# Patient Record
Sex: FEMALE | Race: OTHER | NOT HISPANIC OR LATINO | URBAN - METROPOLITAN AREA
[De-identification: names, ages, dates, MRNs, and addresses within clinical notes are randomized per-mention and may not be internally consistent; named-entity substitution may affect disease eponyms.]

---

## 2018-12-18 PROBLEM — Z00.00 ENCOUNTER FOR PREVENTIVE HEALTH EXAMINATION: Status: ACTIVE | Noted: 2018-12-18

## 2019-07-10 ENCOUNTER — EMERGENCY (EMERGENCY)
Facility: HOSPITAL | Age: 44
LOS: 1 days | Discharge: HOME | End: 2019-07-10
Attending: EMERGENCY MEDICINE | Admitting: EMERGENCY MEDICINE
Payer: COMMERCIAL

## 2019-07-10 VITALS
HEART RATE: 91 BPM | RESPIRATION RATE: 16 BRPM | OXYGEN SATURATION: 100 % | SYSTOLIC BLOOD PRESSURE: 152 MMHG | DIASTOLIC BLOOD PRESSURE: 98 MMHG | TEMPERATURE: 98 F

## 2019-07-10 PROCEDURE — 73502 X-RAY EXAM HIP UNI 2-3 VIEWS: CPT | Mod: 26,LT

## 2019-07-10 PROCEDURE — 93010 ELECTROCARDIOGRAM REPORT: CPT

## 2019-07-10 PROCEDURE — 71046 X-RAY EXAM CHEST 2 VIEWS: CPT | Mod: 26

## 2019-07-10 PROCEDURE — 99283 EMERGENCY DEPT VISIT LOW MDM: CPT | Mod: 25

## 2019-07-10 PROCEDURE — 73552 X-RAY EXAM OF FEMUR 2/>: CPT | Mod: 26,LT

## 2019-07-10 RX ORDER — ACETAMINOPHEN 500 MG
650 TABLET ORAL ONCE
Refills: 0 | Status: COMPLETED | OUTPATIENT
Start: 2019-07-10 | End: 2019-07-10

## 2019-07-10 RX ADMIN — Medication 650 MILLIGRAM(S): at 21:07
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
## 2019-07-10 NOTE — ED PROVIDER NOTE - NSFOLLOWUPINSTRUCTIONS_ED_ALL_ED_FT
FOLLOW UP WITH YOUR  DOCTOR WITHIN 1 WEEK  BRING THE COPIES OF YOUR RESULTS WITH YOU (PROVIDED)  CAN TAKE TYLENOL 650MG ORALLY EVERY 6 HOURS FOR PAIN OR FEVER.  IBUPROFEN 400MG ORALLY EVERY 6 HOURS FOR PAIN OR FEVER.    CAN TAKE TYLENOL AND IBUPROFEN AT THE SAME TIME  RETURN TO ED FOR NEW OR WORSENING SYMPTOMS.
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
## 2023-03-14 ENCOUNTER — EMERGENCY (EMERGENCY)
Facility: HOSPITAL | Age: 48
LOS: 1 days | Discharge: HOME | End: 2023-03-14
Attending: STUDENT IN AN ORGANIZED HEALTH CARE EDUCATION/TRAINING PROGRAM | Admitting: STUDENT IN AN ORGANIZED HEALTH CARE EDUCATION/TRAINING PROGRAM
Payer: MEDICAID

## 2023-03-14 VITALS
SYSTOLIC BLOOD PRESSURE: 158 MMHG | RESPIRATION RATE: 16 BRPM | OXYGEN SATURATION: 100 % | HEART RATE: 75 BPM | DIASTOLIC BLOOD PRESSURE: 80 MMHG

## 2023-03-14 VITALS
TEMPERATURE: 99 F | OXYGEN SATURATION: 100 % | DIASTOLIC BLOOD PRESSURE: 90 MMHG | HEART RATE: 74 BPM | SYSTOLIC BLOOD PRESSURE: 150 MMHG | RESPIRATION RATE: 15 BRPM

## 2023-03-14 LAB
ALBUMIN SERPL ELPH-MCNC: 4.4 G/DL — SIGNIFICANT CHANGE UP (ref 3.3–5)
ALP SERPL-CCNC: 47 U/L — SIGNIFICANT CHANGE UP (ref 40–120)
ALT FLD-CCNC: 11 U/L — SIGNIFICANT CHANGE UP (ref 4–33)
ANION GAP SERPL CALC-SCNC: 14 MMOL/L — SIGNIFICANT CHANGE UP (ref 7–14)
AST SERPL-CCNC: 21 U/L — SIGNIFICANT CHANGE UP (ref 4–32)
BASE EXCESS BLDV CALC-SCNC: 2.1 MMOL/L — SIGNIFICANT CHANGE UP (ref -2–3)
BASOPHILS # BLD AUTO: 0.04 K/UL — SIGNIFICANT CHANGE UP (ref 0–0.2)
BASOPHILS NFR BLD AUTO: 0.6 % — SIGNIFICANT CHANGE UP (ref 0–2)
BILIRUB SERPL-MCNC: 0.3 MG/DL — SIGNIFICANT CHANGE UP (ref 0.2–1.2)
BLOOD GAS VENOUS COMPREHENSIVE RESULT: SIGNIFICANT CHANGE UP
BUN SERPL-MCNC: 10 MG/DL — SIGNIFICANT CHANGE UP (ref 7–23)
CALCIUM SERPL-MCNC: 9.2 MG/DL — SIGNIFICANT CHANGE UP (ref 8.4–10.5)
CHLORIDE BLDV-SCNC: 105 MMOL/L — SIGNIFICANT CHANGE UP (ref 96–108)
CHLORIDE SERPL-SCNC: 107 MMOL/L — SIGNIFICANT CHANGE UP (ref 98–107)
CO2 BLDV-SCNC: 28.7 MMOL/L — HIGH (ref 22–26)
CO2 SERPL-SCNC: 22 MMOL/L — SIGNIFICANT CHANGE UP (ref 22–31)
CREAT SERPL-MCNC: 0.52 MG/DL — SIGNIFICANT CHANGE UP (ref 0.5–1.3)
EGFR: 115 ML/MIN/1.73M2 — SIGNIFICANT CHANGE UP
EOSINOPHIL # BLD AUTO: 0.05 K/UL — SIGNIFICANT CHANGE UP (ref 0–0.5)
EOSINOPHIL NFR BLD AUTO: 0.7 % — SIGNIFICANT CHANGE UP (ref 0–6)
GAS PNL BLDV: 139 MMOL/L — SIGNIFICANT CHANGE UP (ref 136–145)
GLUCOSE BLDV-MCNC: 96 MG/DL — SIGNIFICANT CHANGE UP (ref 70–99)
GLUCOSE SERPL-MCNC: 95 MG/DL — SIGNIFICANT CHANGE UP (ref 70–99)
HCO3 BLDV-SCNC: 27 MMOL/L — SIGNIFICANT CHANGE UP (ref 22–29)
HCT VFR BLD CALC: 35.9 % — SIGNIFICANT CHANGE UP (ref 34.5–45)
HCT VFR BLDA CALC: 38 % — SIGNIFICANT CHANGE UP (ref 34.5–46.5)
HGB BLD CALC-MCNC: 12.5 G/DL — SIGNIFICANT CHANGE UP (ref 11.7–16.1)
HGB BLD-MCNC: 11.9 G/DL — SIGNIFICANT CHANGE UP (ref 11.5–15.5)
IANC: 4.69 K/UL — SIGNIFICANT CHANGE UP (ref 1.8–7.4)
IMM GRANULOCYTES NFR BLD AUTO: 0.3 % — SIGNIFICANT CHANGE UP (ref 0–0.9)
LACTATE BLDV-MCNC: 2.2 MMOL/L — HIGH (ref 0.5–2)
LYMPHOCYTES # BLD AUTO: 1.65 K/UL — SIGNIFICANT CHANGE UP (ref 1–3.3)
LYMPHOCYTES # BLD AUTO: 24.3 % — SIGNIFICANT CHANGE UP (ref 13–44)
MCHC RBC-ENTMCNC: 30.3 PG — SIGNIFICANT CHANGE UP (ref 27–34)
MCHC RBC-ENTMCNC: 33.1 GM/DL — SIGNIFICANT CHANGE UP (ref 32–36)
MCV RBC AUTO: 91.3 FL — SIGNIFICANT CHANGE UP (ref 80–100)
MONOCYTES # BLD AUTO: 0.34 K/UL — SIGNIFICANT CHANGE UP (ref 0–0.9)
MONOCYTES NFR BLD AUTO: 5 % — SIGNIFICANT CHANGE UP (ref 2–14)
NEUTROPHILS # BLD AUTO: 4.69 K/UL — SIGNIFICANT CHANGE UP (ref 1.8–7.4)
NEUTROPHILS NFR BLD AUTO: 69.1 % — SIGNIFICANT CHANGE UP (ref 43–77)
NRBC # BLD: 0 /100 WBCS — SIGNIFICANT CHANGE UP (ref 0–0)
NRBC # FLD: 0 K/UL — SIGNIFICANT CHANGE UP (ref 0–0)
PCO2 BLDV: 44 MMHG — SIGNIFICANT CHANGE UP (ref 39–52)
PH BLDV: 7.4 — SIGNIFICANT CHANGE UP (ref 7.32–7.43)
PLATELET # BLD AUTO: 204 K/UL — SIGNIFICANT CHANGE UP (ref 150–400)
PO2 BLDV: 51 MMHG — HIGH (ref 25–45)
POTASSIUM BLDV-SCNC: 3.7 MMOL/L — SIGNIFICANT CHANGE UP (ref 3.5–5.1)
POTASSIUM SERPL-MCNC: 3.7 MMOL/L — SIGNIFICANT CHANGE UP (ref 3.5–5.3)
POTASSIUM SERPL-SCNC: 3.7 MMOL/L — SIGNIFICANT CHANGE UP (ref 3.5–5.3)
PROT SERPL-MCNC: 7.3 G/DL — SIGNIFICANT CHANGE UP (ref 6–8.3)
RBC # BLD: 3.93 M/UL — SIGNIFICANT CHANGE UP (ref 3.8–5.2)
RBC # FLD: 11.9 % — SIGNIFICANT CHANGE UP (ref 10.3–14.5)
SAO2 % BLDV: 78.7 % — SIGNIFICANT CHANGE UP (ref 67–88)
SODIUM SERPL-SCNC: 143 MMOL/L — SIGNIFICANT CHANGE UP (ref 135–145)
TROPONIN T, HIGH SENSITIVITY RESULT: <6 NG/L — SIGNIFICANT CHANGE UP
WBC # BLD: 6.79 K/UL — SIGNIFICANT CHANGE UP (ref 3.8–10.5)
WBC # FLD AUTO: 6.79 K/UL — SIGNIFICANT CHANGE UP (ref 3.8–10.5)

## 2023-03-14 PROCEDURE — 99285 EMERGENCY DEPT VISIT HI MDM: CPT

## 2023-03-14 PROCEDURE — 71045 X-RAY EXAM CHEST 1 VIEW: CPT | Mod: 26

## 2023-03-14 NOTE — ED PROVIDER NOTE - CARE PLAN
Principal Discharge DX:	Elevated blood pressure reading  Assessment and plan of treatment:	During your ER visit your evaluated for elevated blood pressure. He had an EKG done and blood work done you're provided with the results. Follow-up with your primary care doctor consider changing antihypertensive medications to first or second line antihypertensives. Return to the ED if you exhibit any new, continued or worsening symptoms.  Secondary Diagnosis:	Dizziness   1

## 2023-03-14 NOTE — ED PROVIDER NOTE - OBJECTIVE STATEMENT
48 y/o F with PMH HTN on metoprolol p/w dizziness and elevated blood pressure. Patient states she had some dizziness in the setting of elevated blood pressure in the 200s. Went to urgent care and was sent here blood pressure is improved. Patient denies any dizziness currently denies any chest pain. Patient states she was started on metoprolol about 4 months ago after being on amlodipine. States her blood pressures have been intermittently elevated. She denies nausea, vomiting, abdominal pain. She states she felt somewhat lightheaded earlier today while her blood pressure was increased. Patient was brought by EMS states that her blood pressure is improved and she is feeling better. Patient denies taking any medications to raise her blood pressure. Denies any alcohol or drug use. Patient follows by PCP was previously on amlodipine which she had changed

## 2023-03-14 NOTE — ED PROVIDER NOTE - PLAN OF CARE
During your ER visit your evaluated for elevated blood pressure. He had an EKG done and blood work done you're provided with the results. Follow-up with your primary care doctor consider changing antihypertensive medications to first or second line antihypertensives. Return to the ED if you exhibit any new, continued or worsening symptoms.

## 2023-03-14 NOTE — ED ADULT NURSE NOTE - OBJECTIVE STATEMENT
46 y/o female, a&ox4, received to ED with c/o HTN. Pt reports elevated BP at home, 190s systolic, denies HA, dizziness, CP, SOB, or other s/s of HTN. PM of HTN, claims she is complaint with her daily meds. Pt arrives with 20GIV to [facility] from EMS. Labs collected and sent off. Respirations are even and unlabored, no signs of respiratory distress.

## 2023-03-14 NOTE — ED PROVIDER NOTE - PATIENT PORTAL LINK FT
You can access the FollowMyHealth Patient Portal offered by [facility] by registering at the following website: [email or web address]. By joining [facility]’s FollowMyHealth portal, you will also be able to view your health information using other applications (apps) compatible with our system.

## 2023-03-14 NOTE — ED PROVIDER NOTE - CLINICAL SUMMARY MEDICAL DECISION MAKING FREE TEXT BOX
48 y/o F with PMH HTN on metoprolol p/w dizziness and elevated blood pressure. Patient states she had some dizziness in the setting of elevated blood pressure in the 200s. Went to urgent care and was sent here blood pressure is improved. Patient denies any dizziness currently denies any chest pain. Patient with neuro exam normal. EKG within normal limits. Nonspecific T wave inversions in V3 we'll obtain CBC, CMP, troponin, chest x-ray. Patient will follow-up with PCP recommended potentially changing blood pressure medication as patient is not on first-line antihypertensive

## 2023-03-14 NOTE — ED PROVIDER NOTE - NS ED ROS FT
denies fever, chills, chest pain, SOB, abdominal pain, diarrhea, dysuria, syncope, bleeding, new rash,weakness, numbness, blurred vision + dizziness   ROS  otherwise negative as per HPI

## 2023-03-14 NOTE — ED ADULT TRIAGE NOTE - CHIEF COMPLAINT QUOTE
Pt  sent for UC for high bp 200/100. Pt went to UC for dizziness. Endorses chest burning. Denies fever, chills, chest pain, sob. PMHx: HTN  pt compliant with medication. Arrive with 20g in left a/c, given ASA 324mg in the field.

## 2023-03-14 NOTE — ED PROVIDER NOTE - PHYSICAL EXAMINATION
Gen: Awake, Alert, WD, WN, NAD  Head:  NC/AT  Eyes:  PERRL, EOMI, Conjunctiva pink, lids normal, no scleral icterus  ENT: OP clear, moist mucus membranes  Neck: supple, nontender, no meningismus, no JVD, trachea midline  Cardiac/CV:  S1 S2, RRR, no M/G/R  Respiratory/Pulm:  CTAB, good air movement, normal resp effort, no wheezes/stridor/retractions/rales/rhonchi  Gastrointestinal/Abdomen:  Soft, nontender, nondistended, +BS, no rebound/guarding  Back:  no CVAT, no MLT  Ext:  warm, well perfused, moving all extremities spontaneously, no peripheral edema, distal pulses intact  Skin: intact, no rash  Neuro:  AAOx3, sensation intact, motor 5/5 x 4 extremities, normal gait, speech clear

## 2023-03-14 NOTE — ED ADULT NURSE REASSESSMENT NOTE - NS ED NURSE REASSESS COMMENT FT1
Pt a&ox4, stable VS noted, denies CP, SOB, or dyspnea. Respirations are even and unlabored, no signs of respiratory distress.

## 2023-11-03 NOTE — ED PROVIDER NOTE - PHYSICAL EXAMINATION
Destruction After The Procedure: No VS:  unremarkable except htn    GEN - mild distress L hip pain; A+O x3   HEAD - NC/AT     ENT - PEERL, EOMI, mucous membranes  moist , no discharge      NECK: Neck supple, non-tender without lymphadenopathy, no masses, no JVD  PULM - CTA b/l,  symmetric breath sounds.  mild ant chest wall ttp, no deformity.    COR -  normal heart sounds    ABD - , ND, NT, soft,  BACK - no CVA tenderness, nontender spine     EXTREMS - no edema, no deformity, warm and well perfused.  L hip lateral ttp, no deformity.  mild pain with ROM but normal int/ext rotation and normal lie and length.  Distal NVT intact.     SKIN - no rash or bruising      NEUROLOGIC - alert, CN 2-12 intact, sensation nl, motor no focal deficit.